# Patient Record
Sex: MALE | Race: WHITE | Employment: UNEMPLOYED | URBAN - METROPOLITAN AREA
[De-identification: names, ages, dates, MRNs, and addresses within clinical notes are randomized per-mention and may not be internally consistent; named-entity substitution may affect disease eponyms.]

---

## 2019-05-11 ENCOUNTER — HOSPITAL ENCOUNTER (EMERGENCY)
Age: 29
Discharge: HOME | End: 2019-05-12
Attending: EMERGENCY MEDICINE
Payer: COMMERCIAL

## 2019-05-11 VITALS
BODY MASS INDEX: 27.4 KG/M2 | HEIGHT: 69 IN | TEMPERATURE: 97.6 F | OXYGEN SATURATION: 100 % | WEIGHT: 185 LBS | SYSTOLIC BLOOD PRESSURE: 123 MMHG | HEART RATE: 115 BPM | DIASTOLIC BLOOD PRESSURE: 82 MMHG | RESPIRATION RATE: 18 BRPM

## 2019-05-11 DIAGNOSIS — T40.601A OPIATE OVERDOSE, ACCIDENTAL OR UNINTENTIONAL, INITIAL ENCOUNTER (HCC): ICD-10-CM

## 2019-05-11 DIAGNOSIS — R03.0 ELEVATED BLOOD PRESSURE READING: ICD-10-CM

## 2019-05-11 DIAGNOSIS — R45.851 SUICIDAL IDEATION: ICD-10-CM

## 2019-05-11 DIAGNOSIS — F12.90 MARIJUANA USE: ICD-10-CM

## 2019-05-11 DIAGNOSIS — F15.10 METHAMPHETAMINE USE (HCC): ICD-10-CM

## 2019-05-11 DIAGNOSIS — T50.901A ACCIDENTAL DRUG OVERDOSE, INITIAL ENCOUNTER: Primary | ICD-10-CM

## 2019-05-11 LAB
A/G RATIO: 1.6 (ref 1.1–2.2)
ACETAMINOPHEN LEVEL: <5 UG/ML (ref 10–30)
ALBUMIN SERPL-MCNC: 4.7 G/DL (ref 3.4–5)
ALP BLD-CCNC: 72 U/L (ref 40–129)
ALT SERPL-CCNC: 38 U/L (ref 10–40)
AMPHETAMINE SCREEN, URINE: POSITIVE
ANION GAP SERPL CALCULATED.3IONS-SCNC: 12 MMOL/L (ref 3–16)
AST SERPL-CCNC: 18 U/L (ref 15–37)
BACTERIA: ABNORMAL /HPF
BARBITURATE SCREEN URINE: ABNORMAL
BASOPHILS ABSOLUTE: 0 K/UL (ref 0–0.2)
BASOPHILS RELATIVE PERCENT: 0.1 %
BENZODIAZEPINE SCREEN, URINE: ABNORMAL
BILIRUB SERPL-MCNC: 1.1 MG/DL (ref 0–1)
BILIRUBIN URINE: ABNORMAL
BLOOD, URINE: NEGATIVE
BUN BLDV-MCNC: 13 MG/DL (ref 7–20)
CALCIUM SERPL-MCNC: 9 MG/DL (ref 8.3–10.6)
CANNABINOID SCREEN URINE: POSITIVE
CASTS: ABNORMAL /LPF
CHLORIDE BLD-SCNC: 97 MMOL/L (ref 99–110)
CLARITY: CLEAR
CO2: 25 MMOL/L (ref 21–32)
COCAINE METABOLITE SCREEN URINE: ABNORMAL
COLOR: YELLOW
CREAT SERPL-MCNC: 1.2 MG/DL (ref 0.9–1.3)
EOSINOPHILS ABSOLUTE: 0 K/UL (ref 0–0.6)
EOSINOPHILS RELATIVE PERCENT: 0.1 %
ETHANOL: NORMAL MG/DL (ref 0–0.08)
GFR AFRICAN AMERICAN: >60
GFR NON-AFRICAN AMERICAN: >60
GLOBULIN: 3 G/DL
GLUCOSE BLD-MCNC: 101 MG/DL (ref 70–99)
GLUCOSE URINE: NEGATIVE MG/DL
HCT VFR BLD CALC: 44.2 % (ref 40.5–52.5)
HEMOGLOBIN: 15.2 G/DL (ref 13.5–17.5)
KETONES, URINE: NEGATIVE MG/DL
LEUKOCYTE ESTERASE, URINE: NEGATIVE
LYMPHOCYTES ABSOLUTE: 0.8 K/UL (ref 1–5.1)
LYMPHOCYTES RELATIVE PERCENT: 8.9 %
Lab: ABNORMAL
MAGNESIUM: 2.3 MG/DL (ref 1.8–2.4)
MCH RBC QN AUTO: 31 PG (ref 26–34)
MCHC RBC AUTO-ENTMCNC: 34.3 G/DL (ref 31–36)
MCV RBC AUTO: 90.4 FL (ref 80–100)
METHADONE SCREEN, URINE: ABNORMAL
MICROSCOPIC EXAMINATION: YES
MONOCYTES ABSOLUTE: 0.4 K/UL (ref 0–1.3)
MONOCYTES RELATIVE PERCENT: 4.5 %
MUCUS: ABNORMAL /LPF
NEUTROPHILS ABSOLUTE: 7.6 K/UL (ref 1.7–7.7)
NEUTROPHILS RELATIVE PERCENT: 86.4 %
NITRITE, URINE: NEGATIVE
OPIATE SCREEN URINE: ABNORMAL
OXYCODONE URINE: ABNORMAL
PDW BLD-RTO: 14.1 % (ref 12.4–15.4)
PH UA: 6
PH UA: 6 (ref 5–8)
PHENCYCLIDINE SCREEN URINE: ABNORMAL
PLATELET # BLD: 229 K/UL (ref 135–450)
PMV BLD AUTO: 9.2 FL (ref 5–10.5)
POTASSIUM REFLEX MAGNESIUM: 3.5 MMOL/L (ref 3.5–5.1)
PROPOXYPHENE SCREEN: ABNORMAL
PROTEIN UA: 30 MG/DL
RBC # BLD: 4.89 M/UL (ref 4.2–5.9)
RBC UA: ABNORMAL /HPF (ref 0–2)
SALICYLATE, SERUM: <0.3 MG/DL (ref 15–30)
SODIUM BLD-SCNC: 134 MMOL/L (ref 136–145)
SPECIFIC GRAVITY UA: >=1.03 (ref 1–1.03)
TOTAL PROTEIN: 7.7 G/DL (ref 6.4–8.2)
URINE REFLEX TO CULTURE: ABNORMAL
URINE TYPE: ABNORMAL
UROBILINOGEN, URINE: 2 E.U./DL
WBC # BLD: 8.8 K/UL (ref 4–11)
WBC UA: ABNORMAL /HPF (ref 0–5)

## 2019-05-11 PROCEDURE — 87591 N.GONORRHOEAE DNA AMP PROB: CPT

## 2019-05-11 PROCEDURE — 80307 DRUG TEST PRSMV CHEM ANLYZR: CPT

## 2019-05-11 PROCEDURE — 83735 ASSAY OF MAGNESIUM: CPT

## 2019-05-11 PROCEDURE — G0480 DRUG TEST DEF 1-7 CLASSES: HCPCS

## 2019-05-11 PROCEDURE — 81001 URINALYSIS AUTO W/SCOPE: CPT

## 2019-05-11 PROCEDURE — 87491 CHLMYD TRACH DNA AMP PROBE: CPT

## 2019-05-11 PROCEDURE — 85025 COMPLETE CBC W/AUTO DIFF WBC: CPT

## 2019-05-11 PROCEDURE — 99284 EMERGENCY DEPT VISIT MOD MDM: CPT

## 2019-05-11 PROCEDURE — 80053 COMPREHEN METABOLIC PANEL: CPT

## 2019-05-11 RX ORDER — CLONIDINE HYDROCHLORIDE 0.1 MG/1
0.1 TABLET ORAL ONCE
Status: DISCONTINUED | OUTPATIENT
Start: 2019-05-11 | End: 2019-05-12 | Stop reason: HOSPADM

## 2019-05-11 RX ORDER — NALOXONE HYDROCHLORIDE 4 MG/.1ML
1 SPRAY NASAL PRN
Qty: 1 EACH | Refills: 0 | Status: SHIPPED | OUTPATIENT
Start: 2019-05-11

## 2019-05-11 RX ORDER — HYDROXYZINE PAMOATE 25 MG/1
25 CAPSULE ORAL ONCE
Status: DISCONTINUED | OUTPATIENT
Start: 2019-05-11 | End: 2019-05-12 | Stop reason: HOSPADM

## 2019-05-11 ASSESSMENT — ENCOUNTER SYMPTOMS
ABDOMINAL PAIN: 0
SHORTNESS OF BREATH: 0

## 2019-05-12 NOTE — ED PROVIDER NOTES
I independently examined and evaluated Blanca Martinez. In brief, patient presenting for evaluation after allergic concerns for suicidal ideation and attempt. Patient states that he accidentally overdosed and does not want to harm himself. Patient states that even if he were to harm himself he would never do it by overdosing and he denies any suicidal or homicidal ideation at all. Focused exam revealed patient is well-appearing and in no acute distress. Slightly rapid speech. His awake and alert with GCS 15. Abdomen soft and nontender. .    ED course: Patient presenting for evaluation of possible suicidal ideation and patient has denied any suicidal ideation throughout his stay. He does admit to abusing when he thought was Suboxone by snorting it and appears to have had an accidental opiate overdose. We'll prescribe Narcan. I have performed a medical clearance examination on this patient. It is my opinion that no medical conditions were discovered that would preclude admission to a behavioral health unit or discharge home. I feel that the patient is medically stable for disposition by the behavioral health team at this time. He is observed and did not have any further respiratory or CNS depression. Patient has been evaluated by the behavioral access team and at this time I feel the patient states that discharge home with close outpatient follow-up. Reasons to return to the ER were discussed at length and all questions answered prior to discharge. All diagnostic, treatment, and disposition decisions were made by myself in conjunction with the advanced practice provider. For all further details of the patient's emergency department visit, please see the advanced practice provider's documentation.     Comment: Please note this report has been produced using speech recognition software and may contain errors related to that system including errors in grammar, punctuation, and spelling, as well as words and phrases that may be inappropriate. If there are any questions or concerns please feel free to contact the dictating provider for clarification.         Ghada Dawson MD  05/11/19 5675

## 2019-05-12 NOTE — ED NOTES
Bed: 07  Expected date:   Expected time:   Means of arrival:   Comments:  Ramu Guidry  05/11/19 1936
Discharge instructions reviewed with pt and pt denied having any questions. Discharge paperwork signed and pt discharged.         Harish Nunez RN  05/11/19 2011
Patient refusing medications JOAQUIN       Starr Felty, RN  05/11/19 8985
Pt ambulated in stable condition to [facility] Report given to [facility].       Zeina Nye, [address]  05/11/19 7681
To [facility] and placed in treatment room B2. Pressured, tangential. Pt cooperative at this time, lying in bed. Will continue to monitor for pt safety.
[]  Not attending to self-care/ADLs    []  Recent significant loss   []  Chronic pain or medical illness   []  Social isolation   []  History of violence   []  Active psychosis   []  Cognitive impairment    []  No outpatient services in place   []  Medication noncompliance   []  No collateral information to support safety   []      PROTECTIVE FACTORS:  [] Age >25 and <55  [] Female gender   [] Denies depression  [] Denies suicidal ideation  [] Does not have lethal plan   [] Does not have access to guns or weapons  [] Patient is verbally pedro for safety  [] No prior suicide attempts  [] No active substance abuse  [] Patient has social or family support  [] No active psychosis or cognitive dysfunction  [] Physically healthy  [] Has outpatient services in place  [] Compliant with recommended medications  [] Collateral information from  supports patient safety   [] Patient is future oriented with plans to   []        Clinical Summary:    Patient presents to [facility] ED on a SOB after EMS arrived for apparent overdose. Pt was administered narcan on site. Upon waking pt told EMS he may have sorted heroin and fentanyl instead of buprenorphine as intended. According to EMS, police on site informed them that family stated pt made suicidal statements. Pt denies an SI/HI/AVH. Pt appears to be on methamphetamine, and drug screen does indicate this. He is pressured, tangential, but does show appropriate insight. He is preoccupied with his male cousin and their struggling relationship. He states they've been fighting a lot about family and pt's addiction to drugs. He is cooperative and expresses interest in addiction and mental health treatment. He is future oriented with plans to stay clean and raise his five children who he has with his fiance in [place]. Patient was evaluated and offered supportive counseling. Unable to retreive any collateral information,       I have read, and agree with this note.       Patient has
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
## 2019-05-13 LAB
C. TRACHOMATIS DNA ,URINE: NEGATIVE
N. GONORRHOEAE DNA, URINE: NEGATIVE